# Patient Record
Sex: MALE | URBAN - NONMETROPOLITAN AREA
[De-identification: names, ages, dates, MRNs, and addresses within clinical notes are randomized per-mention and may not be internally consistent; named-entity substitution may affect disease eponyms.]

---

## 2020-03-27 ENCOUNTER — APPOINTMENT (RX ONLY)
Dept: URBAN - NONMETROPOLITAN AREA CLINIC 30 | Facility: CLINIC | Age: 20
Setting detail: DERMATOLOGY
End: 2020-03-27

## 2020-03-27 DIAGNOSIS — L42 PITYRIASIS ROSEA: ICD-10-CM

## 2020-03-27 PROCEDURE — ? PATIENT SPECIFIC COUNSELING

## 2020-03-27 PROCEDURE — ? COUNSELING

## 2020-03-27 PROCEDURE — 99202 OFFICE O/P NEW SF 15 MIN: CPT

## 2020-03-27 PROCEDURE — ? PRESCRIPTION

## 2020-03-27 RX ORDER — TRIAMCINOLONE ACETONIDE 1 MG/G
OINTMENT TOPICAL
Qty: 1 | Refills: 1 | Status: ERX | COMMUNITY
Start: 2020-03-27

## 2020-03-27 RX ADMIN — TRIAMCINOLONE ACETONIDE: 1 OINTMENT TOPICAL at 00:00

## 2020-03-27 ASSESSMENT — LOCATION SIMPLE DESCRIPTION DERM
LOCATION SIMPLE: LEFT LOWER BACK
LOCATION SIMPLE: ABDOMEN

## 2020-03-27 ASSESSMENT — LOCATION DETAILED DESCRIPTION DERM
LOCATION DETAILED: LEFT INFERIOR LATERAL MIDBACK
LOCATION DETAILED: PERIUMBILICAL SKIN

## 2020-03-27 ASSESSMENT — LOCATION ZONE DERM: LOCATION ZONE: TRUNK

## 2020-03-27 NOTE — PROCEDURE: PATIENT SPECIFIC COUNSELING
rash started left abdomen 6 days ago. He thinks several lesions at once. in past few days has extended to chest axilla, prox thighs and right abdomen. He denies on back\\n\\nphotos reviewed showing 5-9mm pink papules in distribution bilaterally symetrical at Blashko lines , abdomen, flank and lower chest in pics.\\n\\ndiscussed likely pityriasis rosea. DDX includes new onset psoriasis, eczema (does have asthma). No recent meds to suggest drug exanthemn.\\nMom asked if rash could be from COVID 19. I told her unlikely. There have been a few case reports of rash with COVID 19 and it is a viral exanthem picture. These are larger papules and distribution suggests MO.\\n\\nadvised topical steroid and UVB exposure to decrease mild itch\\n\\ncall if continues to develop beyond 2 weeks or if not resolved within 4-6 weeks
Detail Level: Detailed
Patient  not feeling well for couple days.  Low grade fever slight sore throat. Already had rash. Advised symptoms suspicious for COVID 19. Call PCP, continue isolation as he has been doing
Patient consented to telehealth visit today.  Sun exposure in small small doses will help.  Call the office if no improvement.  If itching is not better in 4-5 days call the office.

## 2020-03-27 NOTE — HPI: RASH
What Type Of Note Output Would You Prefer (Optional)?: Standard Output
Is The Patient Presenting As Previously Scheduled?: Yes
How Severe Is Your Rash?: mild
Is This A New Presentation, Or A Follow-Up?: Rash
Additional History: Telehealth video visit.